# Patient Record
Sex: FEMALE | Race: WHITE
[De-identification: names, ages, dates, MRNs, and addresses within clinical notes are randomized per-mention and may not be internally consistent; named-entity substitution may affect disease eponyms.]

---

## 2017-01-18 ENCOUNTER — HOSPITAL ENCOUNTER (OUTPATIENT)
Dept: HOSPITAL 39 - RAD | Age: 57
End: 2017-01-18
Attending: UROLOGY
Payer: COMMERCIAL

## 2017-01-18 DIAGNOSIS — M12.88: ICD-10-CM

## 2017-01-18 DIAGNOSIS — N20.0: Primary | ICD-10-CM

## 2017-01-19 NOTE — RAD
EXAM DESCRIPTION:

XR ABDOMEN 1 VIEW (KUB)



CLINICAL HISTORY:

RENAL CALCULI



COMPARISON:

None.



IMPRESSION:

Single AP supine view of the abdomen obtained on 2 radiographs shows a

nonspecific, nonobstructive bowel gas pattern.



No abnormal calcifications are seen in the expected location of the

kidneys or ureters.



Sclerotic changes at the pubic symphyseal region are seen. There are disc

degenerative changes and facet arthropathy of the lumbar spine from L4

through S1 incidentally noted.



Lung bases are not well seen on this exam.



Electronically signed by: Mikey Yousif MD 01/19/2017 09:01

## 2017-03-03 ENCOUNTER — HOSPITAL ENCOUNTER (OUTPATIENT)
Dept: HOSPITAL 39 - MRI | Age: 57
Discharge: HOME | End: 2017-03-03
Attending: FAMILY MEDICINE
Payer: SELF-PAY

## 2017-03-03 DIAGNOSIS — M54.30: Primary | ICD-10-CM

## 2017-03-03 NOTE — MRI
EXAM DESCRIPTION: 



Lumbar Spine w/o Contrast



CLINICAL HISTORY: 



Low back pain radiates to right leg.



COMPARISON: 



None.



TECHNIQUE: 



Multiplanar, multisequence MRI of the lumbar spine was performed

without contrast.



FINDINGS:



GENERAL 

Lumbar vertebral bodies show normal height without compression

deformity.

Normal alignment of the lumbar spine seen.

Conus medullaris terminates at L1 and is unremarkable.

Visualized intra-abdominal retroperitoneal structures show no

acute findings.



There is mild bone marrow edema involving the pedicles on the

left at L5 and S1 likely representing stress reactive change.



L1-2 

No significant findings.



L2-3 

No significant findings.



L3-4 

Mild bilateral facet hypertrophic and degenerative changes are

seen with mild ligament flavum thickening. There is minimal

bilateral foraminal encroachment without significant central

canal stenosis



L4-5 

Disc desiccation and mild loss of disc space height is seen with

2-3 mm broad-based disc bulge flattening the ventral surface of

the thecal sac. Mild right greater than left facet hypertrophic

and degenerative changes are seen. No significant spinal canal

stenosis.  There is mild bilateral foraminal encroachment



L5-S1 

Disc desiccation and moderate loss of disc space height. Vacuum

disc is seen.

There is 3 to 4 mm broad-based disc bulge that does not contact

the ventral aspect of the thecal sac or descending S1 nerve

roots. Mild bilateral facet hypertrophic and degenerative changes

are seen. There is moderate to severe bilateral foraminal

encroachment. The exiting L5 nerve roots contact the disc bulging

and and neural foramen bilaterally.



IMPRESSION: 



Mild-to-moderate disc degenerative changes and facet arthropathy

are seen from L3 through S1.



There is moderate to severe bilateral foraminal encroachment at

L5-S1 with at least mild bilateral foraminal encroachment at

L4-5.



Probable stress reactive marrow edema in the pedicles on the left

at L5 and S1.



Electronically signed by:  Mikey Yousif MD  3/3/2017 1:29 PM CST

## 2017-06-19 ENCOUNTER — HOSPITAL ENCOUNTER (OUTPATIENT)
Dept: HOSPITAL 39 - MRI | Age: 57
End: 2017-06-19
Attending: PHYSICIAN ASSISTANT
Payer: COMMERCIAL

## 2017-06-19 DIAGNOSIS — S96.811A: Primary | ICD-10-CM

## 2017-06-19 DIAGNOSIS — M25.371: ICD-10-CM

## 2017-06-19 DIAGNOSIS — M54.5: ICD-10-CM

## 2017-06-19 DIAGNOSIS — M19.071: ICD-10-CM

## 2017-06-19 DIAGNOSIS — M72.2: ICD-10-CM

## 2017-06-19 NOTE — MRI
EXAM DESCRIPTION: 



MRI right ankle



CLINICAL HISTORY: 



Twisting injury. Pain along the lateral ankle



COMPARISON: 



None.



TECHNIQUE: 



Multiplanar, multisequence MR images of the right ankle



FINDINGS: 



Tibiofibular syndesmosis and ligaments are intact. Attenuated

irregular anterior talofibular ligament consistent with remote

tear. Calcaneofibular ligament is attenuated but appears intact.

Posterior talofibular ligament intact. Deltoid ligament intact



Full-thickness chondral loss along the apex of the lateral talar

dome with subchondral marrow edema over about 8 x 4 mm. Diffuse

chondral thinning over the talar dome otherwise without another

subchondral marrow abnormality. Full-thickness chondral loss

along the anterior lateral tibia with mild underlying subchondral

cortical irregularity and edema over about 1.5 x 0.7 cm.

Prominent anterior tibial joint line osteophyte and anterior

medial talar osteophyte. Posterior tibiotalar osteophytes are

also present. High-grade chondral loss medial tibiotalar and

posterior talofibular with subtle marginal edema. Small joint

effusion with mild synovitis. No intra-articular body



Joint line osteophytes along the posterior margin of the subtalar

joint. Marginal full-thickness chondrosis and underlying focal

subchondral cystic change and edema inferior lateral process of

the talus. There are synovial septations in the posterior recess

ankle joint. There are a couple of fatty density structures on

T1-weighted images likely subsynovial fat as opposed to

intra-articular loose bodies



Large well-corticated plantar calcaneal spur with diffuse edema

in the spur and in the plantar calcaneus. Surrounding soft tissue

edema. No fluid signal within the plantar fascia to diagnose tear



Complete tear posterior tibial tendon with no normal tendon

retromalleolar and along the medial ankle joint. There is a

appears to be tendon fragment attached to the navicular as well

as the tendon fragments to the cuneiform bones. Calcaneonavicular

ligament is torn with associated flatfoot deformity. Flexor

digitorum and flexor hallux tendons are intact



Chronic Achilles tendinosis with fusiform thickening of the

tendon with the epicenter about 4.3 cm above the calcaneal

insertion. Mild intratendinous increased signal distally without

tear



Dorsiflexion tendons are intact. Insertional tendinosis tibialis

anterior



No osteochondral lesion tarsometatarsal. Midfoot interosseous

ligaments are intact



IMPRESSION: 



Tibiotalar osteoarthritis. Focal thickness chondral loss along

the anterolateral tibia and small region along the lateral talar

dome. High-grade chondral loss medial tibiotalar and posterior

talofibular with subtle marginal edema



Severe acute on chronic plantar fasciitis/enthesitis



Chronic Achilles tendinosis



Complete tear posterior tibial tendon and calcaneonavicular

ligament with flatfoot. This is likely complete tear superimposed

on severe chronic tendinosis



Electronically signed by:  Matthias Simmons MD  6/19/2017 11:18

AM CDT Workstation: [id number]

## 2018-11-09 ENCOUNTER — HOSPITAL ENCOUNTER (OUTPATIENT)
Dept: HOSPITAL 39 - MRI | Age: 58
End: 2018-11-09
Attending: FAMILY MEDICINE
Payer: COMMERCIAL

## 2018-11-09 DIAGNOSIS — M76.892: ICD-10-CM

## 2018-11-09 DIAGNOSIS — M22.42: ICD-10-CM

## 2018-11-09 DIAGNOSIS — M25.462: ICD-10-CM

## 2018-11-09 DIAGNOSIS — S89.82XA: Primary | ICD-10-CM

## 2018-11-09 NOTE — MRI
Study: MRI of the Left Knee. 



Indication: M25.562  



Technique: Multiplanar, multi sequence MRI of the left knee was

obtained without intravenous contrast. 



Comparison: None.



Findings:



Linear increased PD signal ACL indicating sequela of previous

interstitial injury. No acute full-thickness tear. PCL, MCL, and

lateral collateral ligament complex intact.



Irregular superior surface tearing posterior horn/root medial

meniscus with a tiny flap fragment at this site displaced

laterally into the condylar notch. Free edge fraying of the body

which is extruded by 2 mm. Grade 3-4 chondral thinning and

surface irregularity central to medial weightbearing portions

medial compartment with mild subchondral marrow change.



Mild discoid configuration lateral meniscus with free edge

fraying throughout. Grade 2-3 chondrosis throughout the lateral

compartment with grade 3 chondral fissuring of the central to

medial weightbearing portions of the compartment.



Low-grade tendinosis quadriceps tendon insertion and patellar

tendon origin without tear. Grade 4 chondral thinning throughout

the majority of the lateral and midline femoral trochlea with

patchy grade 3 and mild grade 4 chondral fissuring of the lateral

patellar facet and apex.



Moderate size knee effusion with scattered synovitis. No acute

fracture. 9 mm ovoid fat signal intensity anterior to the

anterior root attachment lateral meniscus concerning for ossified

loose body.



Impression:



Irregular superior surface tearing posterior horn/root medial

meniscus with free edge fraying of the body.



Mild discoid configuration lateral meniscus with scattered

fissuring throughout.



Prior interstitial injury ACL without acute full-thickness tear.



Low-grade tendinosis quadriceps tendon insertion and patellar

tendon origin.



Tricompartmental chondrosis with changes most pronounced at the

lateral and midline aspects patellofemoral compartment and

central to medial aspects medial compartment where there are

multifocal areas of grade 3-4 chondral loss.



Moderate size knee effusion with scattered synovitis and a

questionable osteophyte loose body anterior to the lateral

meniscus. Correlation with radiographs recommended.



Electronically signed by:  Franklyn Feliciano MD  11/9/2018 10:29 AM

[facility] Workstation: [id number]

## 2019-01-10 NOTE — RAD
EXAM DESCRIPTION: Chest,2 Views



CLINICAL HISTORY: PREOP



COMPARISON: None



TECHNIQUE: PA/lateral



FINDINGS: There is no acute appearing cardiac or pulmonary

abnormality. Heart size is normal with normal pulmonary

vascularity. No pleural effusion or pneumothorax. Lungs are clear

with no consolidating infiltrate. Lateral view shows intact

sternum and T-spine.



IMPRESSION:



No acute process is identified in the chest.



Electronically signed by:  Timoteo Cerda MD  1/10/2019 10:24

AM CST Workstation: [id number]

## 2019-01-11 NOTE — HP
CHIEF COMPLAINT:  Left knee pain.



HISTORY OF PRESENT ILLNESS:  Evelin is a 58-year-old female with a history of
pain in the knee that has been going on for many months.  She had an injection 
on her last visit with us which gave her some relief, however, she has been 
having increasing discomfort over the course of the past month.  She is at a 
point now where it aches continually.  She has difficulty with movement.  She 
has difficulty with weightbearing.  She has had an MRI which shows multiple 
different pathologic changes within the knee.  Because of her ongoing issues, 
the evidence of meniscus tearing and loose bodies within the knee, she has 
requested operative intervention.  After discussing the risks, benefits and 
alternatives to that, she has given informed consent.  



PAST SURGICAL HISTORY:  

1.  Carpal tunnel release.



MEDICATIONS: 

1.  Zorvolex.

2.  Amlodipine.

3.  Simvastatin.

4.  Hydrochlorothiazide. 



ALLERGIES:  NO KNOWN DRUG ALLERGIES.



CODE STATUS:  Full code.



IMMUNIZATIONS:  Up to date.



FAMILY HISTORY:  None pertinent to today's complaint.



SOCIAL HISTORY:  The patient does not smoke or use any illicit drugs.  She does 
drink on occasion.



REVIEW OF SYSTEMS:  Negative except as indicated in the History of Present 
Illness.



PHYSICAL EXAMINATION:



VITAL SIGNS:  Blood pressure 150/90.  Pulse 65.  Height 5'5".  Weight 219 
pounds.



MENTAL STATUS:  The patient is awake, alert, and is able to give a good history 
and participate in the physical.  The patient is oriented to person, place and 
time.



SKIN:  Normal tone and turgor.



MUSCULOSKELETAL:  She is very tender to palpation diffusely about the knee.  She
has intact sensation throughout the extremity and it is warm and well perfused. 
She has no varus/valgus or anterior/posterior laxity.  She has a small effusion 
today.  She has full extension and painful range of motion to about 115 to 120 
degrees.  She does have some clicking in the knee with range of motion.  She has
crepitus with range of motion.



ASSESSMENT:

1.  Meniscal tear.

2.  Loose bodies.

3.  Arthritis.



PLAN:  At this point, because of her ongoing symptoms and failure of 
conservative measures, she has requested operative intervention.  We have 
discussed the risks, benefits, and alternatives to that and the patient has 
given informed consent.



#[id number]

[facility]

## 2019-01-15 ENCOUNTER — HOSPITAL ENCOUNTER (OUTPATIENT)
Dept: HOSPITAL 39 - AMB | Age: 59
End: 2019-01-15
Attending: ORTHOPAEDIC SURGERY
Payer: COMMERCIAL

## 2019-01-15 VITALS — TEMPERATURE: 97.5 F | OXYGEN SATURATION: 96 % | SYSTOLIC BLOOD PRESSURE: 118 MMHG | DIASTOLIC BLOOD PRESSURE: 71 MMHG

## 2019-01-15 DIAGNOSIS — M23.42: ICD-10-CM

## 2019-01-15 DIAGNOSIS — E66.9: ICD-10-CM

## 2019-01-15 DIAGNOSIS — I10: ICD-10-CM

## 2019-01-15 DIAGNOSIS — K21.9: ICD-10-CM

## 2019-01-15 DIAGNOSIS — R00.1: ICD-10-CM

## 2019-01-15 DIAGNOSIS — M17.12: ICD-10-CM

## 2019-01-15 DIAGNOSIS — G47.33: ICD-10-CM

## 2019-01-15 DIAGNOSIS — Z79.899: ICD-10-CM

## 2019-01-15 DIAGNOSIS — Z99.89: ICD-10-CM

## 2019-01-15 DIAGNOSIS — M23.204: Primary | ICD-10-CM

## 2019-01-15 PROCEDURE — 29881 ARTHRS KNE SRG MNISECTMY M/L: CPT

## 2019-01-15 PROCEDURE — 80307 DRUG TEST PRSMV CHEM ANLYZR: CPT

## 2019-01-15 PROCEDURE — 93005 ELECTROCARDIOGRAM TRACING: CPT

## 2019-01-15 PROCEDURE — 80048 BASIC METABOLIC PNL TOTAL CA: CPT

## 2019-01-15 PROCEDURE — 71046 X-RAY EXAM CHEST 2 VIEWS: CPT

## 2019-01-15 PROCEDURE — 85025 COMPLETE CBC W/AUTO DIFF WBC: CPT

## 2019-01-15 PROCEDURE — 36415 COLL VENOUS BLD VENIPUNCTURE: CPT

## 2019-01-15 PROCEDURE — 87070 CULTURE OTHR SPECIMN AEROBIC: CPT

## 2019-01-15 PROCEDURE — 01400 ANES OPN/ARTHRS KNEE JT NOS: CPT

## 2019-01-15 PROCEDURE — 81001 URINALYSIS AUTO W/SCOPE: CPT

## 2019-01-15 RX ADMIN — VANCOMYCIN HYDROCHLORIDE ONE MG: 500 INJECTION, POWDER, LYOPHILIZED, FOR SOLUTION INTRAVENOUS at 08:00

## 2019-01-15 RX ADMIN — VANCOMYCIN HYDROCHLORIDE ONE MG: 500 INJECTION, POWDER, LYOPHILIZED, FOR SOLUTION INTRAVENOUS at 08:36

## 2019-01-21 NOTE — OP
DATE OF PROCEDURE:  01/15/19



PREOPERATIVE DIAGNOSIS: 

1.  Knee pain with mechanical symptoms.



POSTOPERATIVE DIAGNOSIS: 

1.  Knee pain with mechanical symptoms.



PROCEDURE: 

1.  Knee arthroscopy with debridement.  

2.  Partial meniscectomy.



SURGEON:  David Colon MD.



ASSISTANT:  Inderjit Krishna CST, SA-C.



ANESTHESIA:  General anesthesia.



COMPLICATIONS:  None.



FINDINGS:   

1.  Tear in the medial meniscus extending from the anterior body posteriorly.

2.  Advanced osteoarthritis of the medial compartment.

3.  Normal ACL, normal PCL.

4.  Advanced osteoarthritis of the lateral compartment.

5.  Degenerative tearing of the lateral meniscus.

6.  Normal lateral gutter.

7.  Normal suprapatellar pouch.

8.  Advanced arthritis of the patellofemoral joint.

9.  Normal medial gutter.



INDICATION:  Ms. Chan has a history of severe knee pain that has been 
refractory to conservative measures.  Because of her pain and refractory nature,
she has had difficulty with her daily activities and it has gotten progressively
worse.  She has also developed some mechanical symptoms.  Because of the ongoing
symptoms, she has requested operative intervention.  After discussing the risks,
benefits and alternatives to operative therapy, the patient has given informed 
consent for that.



PROCEDURE:  The patient was brought to the Operating Room and placed in supine 
position.  General anesthesia was induced and the patient's leg was sterilely 
prepped and draped.  Following prepping and draping, standard anteromedial and 
anterolateral portals were established.  Diagnostic arthroscopy was carried out 
with the above findings.  Following diagnostic arthroscopy, attention was then 
focused on medial compartment.  The 3.5 mm full radius shaver was used to 
debride the medial meniscus.  The meniscus was thoroughly probed and there were 
no loose fragments or unstable portions.  The chondral surfaces were debrided 
both in the medial and lateral compartments.  The knee was very thoroughly 
irrigated and the wounds were closed with Nylon suture.  Sterile dressings were 
placed.  The patient was awoken from anesthesia and taken to Recovery.  



POSTOPERATIVE PLAN:  The patient will be partial weightbearing and will followup
with us in two days.



#[id number]

[facility]

## 2019-09-16 ENCOUNTER — HOSPITAL ENCOUNTER (OUTPATIENT)
Dept: HOSPITAL 39 - GMA MATASK | Age: 59
End: 2019-09-16
Attending: FAMILY MEDICINE
Payer: COMMERCIAL

## 2019-09-16 DIAGNOSIS — Z00.00: Primary | ICD-10-CM

## 2022-05-23 ENCOUNTER — APPOINTMENT (RX ONLY)
Dept: URBAN - METROPOLITAN AREA CLINIC 45 | Facility: CLINIC | Age: 62
Setting detail: DERMATOLOGY
End: 2022-05-23

## 2022-05-23 DIAGNOSIS — L57.8 OTHER SKIN CHANGES DUE TO CHRONIC EXPOSURE TO NONIONIZING RADIATION: ICD-10-CM

## 2022-05-23 DIAGNOSIS — L57.3 POIKILODERMA OF CIVATTE: ICD-10-CM

## 2022-05-23 PROCEDURE — ? COUNSELING

## 2022-05-23 PROCEDURE — ? TREATMENT REGIMEN

## 2022-05-23 PROCEDURE — 99203 OFFICE O/P NEW LOW 30 MIN: CPT

## 2022-05-23 PROCEDURE — ? SUNSCREEN RECOMMENDATIONS

## 2022-05-23 ASSESSMENT — LOCATION ZONE DERM
LOCATION ZONE: ARM
LOCATION ZONE: NECK
LOCATION ZONE: TRUNK

## 2022-05-23 ASSESSMENT — LOCATION DETAILED DESCRIPTION DERM
LOCATION DETAILED: LEFT ANTERIOR SHOULDER
LOCATION DETAILED: RIGHT CLAVICULAR SKIN
LOCATION DETAILED: RIGHT ANTERIOR SHOULDER
LOCATION DETAILED: LEFT ANTERIOR PROXIMAL UPPER ARM
LOCATION DETAILED: RIGHT ANTERIOR PROXIMAL UPPER ARM
LOCATION DETAILED: RIGHT INFERIOR ANTERIOR NECK
LOCATION DETAILED: MID POSTERIOR NECK
LOCATION DETAILED: RIGHT SUPERIOR ANTERIOR NECK

## 2022-05-23 ASSESSMENT — LOCATION SIMPLE DESCRIPTION DERM
LOCATION SIMPLE: RIGHT UPPER ARM
LOCATION SIMPLE: LEFT SHOULDER
LOCATION SIMPLE: LEFT UPPER ARM
LOCATION SIMPLE: POSTERIOR NECK
LOCATION SIMPLE: RIGHT CLAVICULAR SKIN
LOCATION SIMPLE: RIGHT ANTERIOR NECK
LOCATION SIMPLE: RIGHT SHOULDER

## 2024-09-16 ENCOUNTER — APPOINTMENT (RX ONLY)
Age: 64
Setting detail: DERMATOLOGY
End: 2024-09-16

## 2024-09-16 DIAGNOSIS — L71.8 OTHER ROSACEA: ICD-10-CM | Status: INADEQUATELY CONTROLLED

## 2024-09-16 DIAGNOSIS — L82.1 OTHER SEBORRHEIC KERATOSIS: ICD-10-CM | Status: STABLE

## 2024-09-16 DIAGNOSIS — L82.0 INFLAMED SEBORRHEIC KERATOSIS: ICD-10-CM | Status: INADEQUATELY CONTROLLED

## 2024-09-16 DIAGNOSIS — L91.8 OTHER HYPERTROPHIC DISORDERS OF THE SKIN: ICD-10-CM | Status: STABLE

## 2024-09-16 DIAGNOSIS — D18.0 HEMANGIOMA: ICD-10-CM | Status: STABLE

## 2024-09-16 DIAGNOSIS — L57.8 OTHER SKIN CHANGES DUE TO CHRONIC EXPOSURE TO NONIONIZING RADIATION: ICD-10-CM | Status: INADEQUATELY CONTROLLED

## 2024-09-16 PROBLEM — D18.01 HEMANGIOMA OF SKIN AND SUBCUTANEOUS TISSUE: Status: ACTIVE | Noted: 2024-09-16

## 2024-09-16 PROCEDURE — 17110 DESTRUCTION B9 LES UP TO 14: CPT

## 2024-09-16 PROCEDURE — ? PRESCRIPTION

## 2024-09-16 PROCEDURE — 99214 OFFICE O/P EST MOD 30 MIN: CPT | Mod: 25

## 2024-09-16 PROCEDURE — ? LIQUID NITROGEN

## 2024-09-16 PROCEDURE — ? COUNSELING

## 2024-09-16 RX ORDER — PHARMACY COMPOUNDING ACCESSORY
EACH MISCELLANEOUS BID
Qty: 30 | Refills: 1 | Status: ERX | COMMUNITY
Start: 2024-09-16

## 2024-09-16 RX ADMIN — Medication: at 00:00

## 2024-09-16 ASSESSMENT — LOCATION SIMPLE DESCRIPTION DERM
LOCATION SIMPLE: RIGHT POSTERIOR THIGH
LOCATION SIMPLE: CHEST
LOCATION SIMPLE: RIGHT ANTERIOR NECK
LOCATION SIMPLE: RIGHT FOREARM
LOCATION SIMPLE: LEFT FOREARM
LOCATION SIMPLE: RIGHT SHOULDER
LOCATION SIMPLE: RIGHT CHEEK
LOCATION SIMPLE: LEFT SHOULDER
LOCATION SIMPLE: ABDOMEN
LOCATION SIMPLE: RIGHT BREAST

## 2024-09-16 ASSESSMENT — LOCATION DETAILED DESCRIPTION DERM
LOCATION DETAILED: RIGHT DISTAL POSTERIOR THIGH
LOCATION DETAILED: LEFT ANTERIOR SHOULDER
LOCATION DETAILED: RIGHT SUPERIOR CENTRAL BUCCAL CHEEK
LOCATION DETAILED: RIGHT POSTERIOR SHOULDER
LOCATION DETAILED: RIGHT PROXIMAL DORSAL FOREARM
LOCATION DETAILED: LEFT POSTERIOR SHOULDER
LOCATION DETAILED: RIGHT INFRAMAMMARY CREASE (INNER QUADRANT)
LOCATION DETAILED: RIGHT RIB CAGE
LOCATION DETAILED: UPPER STERNUM
LOCATION DETAILED: LEFT DISTAL DORSAL FOREARM
LOCATION DETAILED: RIGHT INFERIOR CENTRAL MALAR CHEEK
LOCATION DETAILED: RIGHT INFERIOR LATERAL NECK

## 2024-09-16 ASSESSMENT — LOCATION ZONE DERM
LOCATION ZONE: NECK
LOCATION ZONE: LEG
LOCATION ZONE: ARM
LOCATION ZONE: TRUNK
LOCATION ZONE: FACE